# Patient Record
(demographics unavailable — no encounter records)

---

## 2024-11-05 NOTE — HISTORY OF PRESENT ILLNESS
[FreeTextEntry1] : 63 yo male, hx of hld, with coronary stent last month, on asa and brilinta. Had cologuard pos last year. No family hx of colon cancer. No rectal bleeding. No change in bowel habits. No sob or cp.  Recent coronary stent and possibly need for additional, precludes optical colonoscopy.REcent fibroscan with Cap 268, S0 F0-1.

## 2024-11-05 NOTE — PHYSICAL EXAM
[Alert] : alert [Normal Voice/Communication] : normal voice/communication [Healthy Appearing] : healthy appearing [No Acute Distress] : no acute distress [Sclera] : the sclera and conjunctiva were normal [Hearing Threshold Finger Rub Not [place]] : hearing was normal [Normal Lips/Gums] : the lips and gums were normal [Normal Appearance] : the appearance of the neck was normal [Oropharynx] : the oropharynx was normal [No Neck Mass] : no neck mass was observed [No Respiratory Distress] : no respiratory distress [No Acc Muscle Use] : no accessory muscle use [Respiration, Rhythm And Depth] : normal respiratory rhythm and effort [Auscultation Breath Sounds / Voice Sounds] : lungs were clear to auscultation bilaterally [Heart Rate And Rhythm] : heart rate was normal and rhythm regular [Normal S1, S2] : normal S1 and S2 [Murmurs] : no murmurs [Bowel Sounds] : normal bowel sounds [Abdomen Tenderness] : non-tender [No Masses] : no abdominal mass palpated [Abdomen Soft] : soft [] : no hepatosplenomegaly [Oriented To Time, Place, And Person] : oriented to person, place, and time

## 2024-11-05 NOTE — ASSESSMENT
[FreeTextEntry1] : 65 yo male, hx of hld, with coronary stent last month, on asa and brilinta. Had cologuard pos last year. No family hx of colon cancer. No rectal bleeding. No change in bowel habits. No sob or cp.  Recent coronary stent and possibly need for additional, precludes optical colonoscopy.REcent fibroscan with Cap 268, S0 F0-1.  IMP: pos. cologuard CAD, recent PCI  PLAN: virtual colonoscopy ordered further recommendations based upon results of above